# Patient Record
Sex: FEMALE | Race: OTHER | HISPANIC OR LATINO
[De-identification: names, ages, dates, MRNs, and addresses within clinical notes are randomized per-mention and may not be internally consistent; named-entity substitution may affect disease eponyms.]

---

## 2017-04-24 ENCOUNTER — APPOINTMENT (OUTPATIENT)
Dept: SURGERY | Facility: CLINIC | Age: 52
End: 2017-04-24

## 2017-06-22 ENCOUNTER — APPOINTMENT (OUTPATIENT)
Dept: SURGERY | Facility: CLINIC | Age: 52
End: 2017-06-22

## 2017-06-22 VITALS
WEIGHT: 132 LBS | HEART RATE: 74 BPM | TEMPERATURE: 98.2 F | BODY MASS INDEX: 29.69 KG/M2 | SYSTOLIC BLOOD PRESSURE: 117 MMHG | DIASTOLIC BLOOD PRESSURE: 80 MMHG | HEIGHT: 56 IN

## 2017-09-26 PROBLEM — Z87.19 HISTORY OF ESOPHAGITIS: Status: RESOLVED | Noted: 2017-09-26 | Resolved: 2017-09-26

## 2017-09-26 PROBLEM — Z87.19 HISTORY OF GASTRITIS: Status: RESOLVED | Noted: 2017-09-26 | Resolved: 2017-09-26

## 2017-09-26 PROBLEM — Z87.19 HISTORY OF CHOLECYSTITIS: Status: RESOLVED | Noted: 2017-09-26 | Resolved: 2017-09-26

## 2017-09-26 PROBLEM — K43.9 VENTRAL HERNIA WITHOUT OBSTRUCTION OR GANGRENE: Status: RESOLVED | Noted: 2017-09-26 | Resolved: 2017-09-26

## 2017-09-28 ENCOUNTER — APPOINTMENT (OUTPATIENT)
Dept: SURGERY | Facility: CLINIC | Age: 52
End: 2017-09-28
Payer: MEDICAID

## 2017-09-28 VITALS
HEIGHT: 56 IN | BODY MASS INDEX: 29.47 KG/M2 | WEIGHT: 131 LBS | SYSTOLIC BLOOD PRESSURE: 97 MMHG | DIASTOLIC BLOOD PRESSURE: 64 MMHG | OXYGEN SATURATION: 100 % | TEMPERATURE: 98.2 F | HEART RATE: 72 BPM

## 2017-09-28 DIAGNOSIS — K43.9 VENTRAL HERNIA W/OUT OBSTRUCTION OR GANGRENE: ICD-10-CM

## 2017-09-28 DIAGNOSIS — Z87.19 PERSONAL HISTORY OF OTHER DISEASES OF THE DIGESTIVE SYSTEM: ICD-10-CM

## 2017-09-28 PROCEDURE — XXXXX: CPT

## 2017-10-09 ENCOUNTER — APPOINTMENT (OUTPATIENT)
Dept: SURGERY | Facility: CLINIC | Age: 52
End: 2017-10-09

## 2017-10-09 VITALS
HEIGHT: 56 IN | TEMPERATURE: 98.2 F | BODY MASS INDEX: 29.69 KG/M2 | DIASTOLIC BLOOD PRESSURE: 73 MMHG | WEIGHT: 132 LBS | SYSTOLIC BLOOD PRESSURE: 117 MMHG | OXYGEN SATURATION: 100 % | HEART RATE: 89 BPM

## 2018-09-24 ENCOUNTER — APPOINTMENT (OUTPATIENT)
Dept: SURGERY | Facility: CLINIC | Age: 53
End: 2018-09-24
Payer: MEDICAID

## 2018-09-24 VITALS
HEIGHT: 56 IN | TEMPERATURE: 97.8 F | OXYGEN SATURATION: 100 % | WEIGHT: 139 LBS | BODY MASS INDEX: 31.27 KG/M2 | DIASTOLIC BLOOD PRESSURE: 62 MMHG | HEART RATE: 92 BPM | SYSTOLIC BLOOD PRESSURE: 93 MMHG

## 2018-09-24 PROCEDURE — 99213 OFFICE O/P EST LOW 20 MIN: CPT

## 2018-10-29 ENCOUNTER — APPOINTMENT (OUTPATIENT)
Dept: SURGERY | Facility: CLINIC | Age: 53
End: 2018-10-29
Payer: COMMERCIAL

## 2018-10-29 VITALS
BODY MASS INDEX: 29.25 KG/M2 | HEART RATE: 79 BPM | TEMPERATURE: 98 F | OXYGEN SATURATION: 98 % | DIASTOLIC BLOOD PRESSURE: 77 MMHG | WEIGHT: 130 LBS | SYSTOLIC BLOOD PRESSURE: 116 MMHG | HEIGHT: 56 IN

## 2018-10-29 DIAGNOSIS — N64.4 MASTODYNIA: ICD-10-CM

## 2018-10-29 PROCEDURE — 99213 OFFICE O/P EST LOW 20 MIN: CPT

## 2019-10-28 ENCOUNTER — APPOINTMENT (OUTPATIENT)
Dept: SURGERY | Facility: CLINIC | Age: 54
End: 2019-10-28
Payer: COMMERCIAL

## 2019-10-28 VITALS
SYSTOLIC BLOOD PRESSURE: 121 MMHG | TEMPERATURE: 98.2 F | WEIGHT: 135 LBS | BODY MASS INDEX: 30.37 KG/M2 | HEIGHT: 56 IN | OXYGEN SATURATION: 100 % | HEART RATE: 97 BPM | DIASTOLIC BLOOD PRESSURE: 76 MMHG

## 2019-10-28 DIAGNOSIS — Z86.39 PERSONAL HISTORY OF OTHER ENDOCRINE, NUTRITIONAL AND METABOLIC DISEASE: ICD-10-CM

## 2019-10-28 PROCEDURE — 99213 OFFICE O/P EST LOW 20 MIN: CPT

## 2019-10-28 RX ORDER — OMEPRAZOLE 40 MG/1
40 CAPSULE, DELAYED RELEASE ORAL
Refills: 0 | Status: ACTIVE | COMMUNITY

## 2019-10-28 RX ORDER — SIMVASTATIN 80 MG/1
TABLET, FILM COATED ORAL
Refills: 0 | Status: ACTIVE | COMMUNITY

## 2019-10-28 NOTE — PLAN
[FreeTextEntry1] : Patient is presenting for a follow up visit.  Patient is doing well. Results of her breast imaging and physical examination findings were discussed in details.   Patient was advised to have BL            breast US and Mammogram in  10/2020 and return after the tests. Importance of monthly self-breast examination was reinforced.  Patient's questions and concerns addressed to patient's satisfaction.\par \par

## 2019-10-28 NOTE — DATA REVIEWED
[FreeTextEntry1] : RADIOLOGY REPORT   FINDINGS   FINDING Indication: 54 years old for screening. Reported history of benign right breast biopsy 2 years ago. There is no family history of breast cancer.  Date of last clinical exam:"Not available.".  Comparison is made with prior mammogram and bilateral breast ultrasound dated 10/12/2018.  CC and MLO views of both breasts were obtained. This study was interpreted in conjunction with the [employer] computer aided detection system.  There are scattered fibroglandular densities. No dominant masses, significant calcifications or areas of architectural distortion are identified. Nonenlarged bilateral axillary lymph nodes are present.  Bilateral breast ultrasound was performed around the clock including the retroareolar regions and axillae using a high resolution linear array transducer.   Right breast:  10:00, 3 cm from the nipple, stable 9 mm cluster of cysts.  10:00, 6 cm from the nipple, newly visualized 5 mm cluster of cysts.  Left breast:  3:00, 8 cm from the nipple, stable 8 mm cluster of cysts.   There are no enlarged axillary lymph nodes on either side.  IMPRESSION:   No mammographic or sonographic evidence of malignancy.  BI-RADS CATEGORY: 2 - Benign   Recommendation: Routine annual screening mammogram.

## 2019-10-28 NOTE — HISTORY OF PRESENT ILLNESS
[de-identified] : This is  a 54 years old patient presenting today for a breast exam and to discuss the results of her recent  breast imaging. Patient had a BL breast US and   BL breast Mammogram on 10/16/2019. Deemed BIRADS category 2. Patient denies any trauma and she has no nipple discharge. Patient denies any fever, night sweats or loss of appetite.    There is no family history of breast carcinoma.  Her last menstrual period was  last year.  Patient is on no hormonal replacement therapy.  [de-identified] : Patient reports no interval changes to her overall health status or medical history

## 2019-10-28 NOTE — PHYSICAL EXAM
[Alert] : alert [Oriented to Person] : oriented to person [Oriented to Place] : oriented to place [Oriented to Time] : oriented to time [Calm] : calm [de-identified] : The patient is alert, well-groomed, and cheerful. [de-identified] : Thorax symmetric with good excursion. Lungs resonant. Breath sounds vesicular with no added sounds. [de-identified] : symmetric breasts with no nipple or skin retraction.  On palpation of her breasts, there is no palpable masses.  \par    No palpable lymph nodes.  No palpable supraclavicular masses. Axillas are benign.

## 2021-06-28 ENCOUNTER — APPOINTMENT (OUTPATIENT)
Dept: SURGERY | Facility: CLINIC | Age: 56
End: 2021-06-28
Payer: COMMERCIAL

## 2021-06-28 VITALS — TEMPERATURE: 97.7 F

## 2021-06-28 VITALS
HEIGHT: 56 IN | HEART RATE: 96 BPM | WEIGHT: 135 LBS | OXYGEN SATURATION: 99 % | BODY MASS INDEX: 30.37 KG/M2 | SYSTOLIC BLOOD PRESSURE: 125 MMHG | DIASTOLIC BLOOD PRESSURE: 77 MMHG

## 2021-06-28 DIAGNOSIS — M54.9 DORSALGIA, UNSPECIFIED: ICD-10-CM

## 2021-06-28 PROCEDURE — 99213 OFFICE O/P EST LOW 20 MIN: CPT

## 2021-06-28 NOTE — VITALS
[de-identified] : 7/10 [FreeTextEntry3] : back  [FreeTextEntry1] : rest  [FreeTextEntry2] : walking

## 2021-06-28 NOTE — DATA REVIEWED
[FreeTextEntry1] : Patient Name\par ROBBIE NAJERA\par Date of Birth\par 1965\par Procedure\par MA 3D DIGITAL SCREENING MAMMOGRAPHY\par Study Date & Time\par 2020-10-26 10:36 AM\par Patient ID\par [id number]\par Accession Number\par [id number]\par Referring Physician\par YOEL SAINI\par Institution Name\par [facility]\par Report\par RADIOLOGY REPORT\par FINDINGS\par FINDING\par Indication: 55 years old for screening. There is no family history of breast cancer.\par Date of last clinical exam:"Not available".\par Comparison is made with multiple prior mammograms dating back to 8/24/2017.\par 3D digital CC and MLO views of both breasts were obtained. Reconstructed CC and MLO views were\par obtained. This study was interpreted in conjunction with a computer aided detection system.\par The breasts are composed of scattered fibroglandular elements. Bilateral scattered coarse and\par punctate benign-appearing calcifications are unchanged. No dominant masses, significant\par calcifications or areas of architectural distortion are identified. Nonenlarged bilateral axillary\par lymph nodes are present.\par IMPRESSION:\par No mammographic evidence of malignancy.\par BI-RADS CATEGORY: 2 - Benign\par Recommendation: Routine annual screening mammogram.\par A letter will be sent to the patient with the above recommendations.\par Approximately 10% of breast carcinomas are not radiographically detectable. A negative report should\par not delay biopsy if a clinically suspicious mass is present. Dense breasts may obscure an underlying\par neoplasm.\par Patient has been added into a reminder system with a target due date for their next mammogram.\par BIRADS 1: Negative\par BIRADS 2: Benign\par BIRADS 3: Probably Benign\par BIRADS 4: Suspicious Abnormality - Biopsy should be considered.\par BIRADS 4a: Low Suspicion of Malignancy\par BIRADS 4b: Intermediate Suspicion of Malignancy\par 6/23/2021 [employer]\par [email or web address] 2/2\par BIRADS 4c: Moderately Suspicious of Malignancy\par BIRADS 5: Highly Suspicious of Malignancy\par BIRADS 6: Known Malignancy\par BIRADS 0: Incomplete - Need Additional Imaging Evaluation and/or Prior Mammograms for Comparison\par The [place] Department of Health requires us to indicate the date of the patient's last\par clinical breast examination.\par Electronically signed by: Jeane Vaca MD 10/27/2020 10:29 AM\par Workstation: [id number]\par Electronically Signed By: Jeane Vaca MD\par Sign Date: 27-OCT-20[facility]

## 2021-06-28 NOTE — PLAN
[FreeTextEntry1] : Ms. NAJERA  is presenting  today for an evaluation .   she has back pain due to the breast size. she is doing well and offers no other  complaints.  Results of  her recent  imaging and physical examination findings were discussed in details.   She  was advised to have BL       breast US and Mammogram in  October and return after the tests. she was advised to see plastic surgeon for possible mammoplasty.  Importance of monthly self-breast examination was reinforced.  Patient's questions and concerns addressed to patient's satisfaction.\par \par Vitamin E daily for breast pain \par Avoid caffein and Chocolates to prevent breast pain

## 2021-06-28 NOTE — HISTORY OF PRESENT ILLNESS
[de-identified] :  This is  a 56 year   old patient presenting today for a breast exam and  with complaints of severe back pain due to the breast size. Patient had a BL breast  Mammogram on 10/26/2020Deemed BIRADS category 2.   Her breast size is 38 DD.  she takes Tylenol and Ibuprofen for pain. Ms. NAJERA denies any trauma and she has no nipple discharge. Patient denies any fever, night sweats or loss of appetite.    There is no family history of breast carcinoma.  Patient is on no hormonal replacement therapy.

## 2021-06-28 NOTE — PHYSICAL EXAM
[Alert] : alert [Oriented to Person] : oriented to person [Oriented to Place] : oriented to place [Oriented to Time] : oriented to time [Calm] : calm [de-identified] : She  is alert, well-groomed, and in NAD\par   [de-identified] : anicteric.  Nasal mucosa pink, septum midline. Oral mucosa pink.  Tongue midline, Pharynx without exudates.\par   [de-identified] : Neck supple. Trachea midline. Thyroid isthmus barely palpable, lobes not felt.\par   [de-identified] : No chest deformity. Breast are symmetric, Normal contours. No nodules, masses, tenderness, or axillary or supraclavicular  adenopathy. No nipple discharge. no skin retraction \par

## 2021-11-02 PROBLEM — Z12.39 SCREENING BREAST EXAMINATION: Status: ACTIVE | Noted: 2019-10-28

## 2021-11-04 ENCOUNTER — APPOINTMENT (OUTPATIENT)
Dept: SURGERY | Facility: CLINIC | Age: 56
End: 2021-11-04
Payer: MEDICAID

## 2021-11-04 VITALS
DIASTOLIC BLOOD PRESSURE: 72 MMHG | WEIGHT: 138 LBS | SYSTOLIC BLOOD PRESSURE: 115 MMHG | BODY MASS INDEX: 31.05 KG/M2 | HEART RATE: 82 BPM | HEIGHT: 56 IN

## 2021-11-04 VITALS — TEMPERATURE: 97 F

## 2021-11-04 DIAGNOSIS — Z12.39 ENCOUNTER FOR OTHER SCREENING FOR MALIGNANT NEOPLASM OF BREAST: ICD-10-CM

## 2021-11-04 PROCEDURE — 99213 OFFICE O/P EST LOW 20 MIN: CPT

## 2021-11-04 NOTE — HISTORY OF PRESENT ILLNESS
[de-identified] : This is  a 56 year   old patient presenting today for a breast exam and to discuss the results of her recent  breast imaging.     she was seen in June  with complaints of severe back pain due to the breast size.  Her breast size is 38 DD.   Patient had a BL breast US and   BL breast Mammogram on 10/28/2021. Deemed BIRADS category 2.   Ms. NAJERA denies any trauma and she has no nipple discharge. Patient denies any fever, night sweats or loss of appetite.  she is seeing a plastic surgeon for mammoplasty. \par \par \par  [de-identified] : Patient reports no interval changes to her overall health status or medical history

## 2021-11-04 NOTE — PHYSICAL EXAM
[Alert] : alert [Oriented to Person] : oriented to person [Oriented to Place] : oriented to place [Oriented to Time] : oriented to time [Calm] : calm [de-identified] : She  is alert, well-groomed, and in NAD\par   [de-identified] : anicteric.  Nasal mucosa pink, septum midline. Oral mucosa pink.  Tongue midline, Pharynx without exudates.\par   [de-identified] : Neck supple. Trachea midline. Thyroid isthmus barely palpable, lobes not felt.\par   [de-identified] : No chest deformity. Breast are symmetric, Normal contours. No nodules, masses, tenderness, or axillary or supraclavicular  adenopathy. No nipple discharge. no skin retraction \par

## 2021-11-04 NOTE — DATA REVIEWED
[FreeTextEntry1] : Patient Name\par ROBBIE NAJERA\par Date of Birth\par 1965\par Procedure\par US SCREENING BREAST BILATERAL\par Study Date & Time\par 2021-10-28 10:02 AM\par Patient ID\par [id number]\par Accession Number\par [id number]\par Referring Physician\par SHELLI CARRANZA\par Institution Name\par [facility]\par Report\par RADIOLOGY REPORT\par FINDINGS\par FINDING\par Indication: 56 years old for screening. Reported history of benign right breast biopsy 2 years ago.\par There is no family history of breast cancer.\par Date of last clinical exam:"6/28/2021".\par Comparison is made with multiple prior mammograms dating back to 10/12/2018 and prior breast\par ultrasounds dated 10/16/2019 and 10/12/2018.\par 3D digital CC and MLO views of both breasts were obtained. Reconstructed CC and MLO views were\par obtained. This study was interpreted in conjunction with a computer aided detection system.\par Findings:\par The breast tissue is heterogeneously dense, which may obscure small masses. Bilateral scattered\par coarse benign-appearing calcifications are unchanged. No dominant masses, significant calcifications\par or areas of architectural distortion are identified. Nonenlarged bilateral axillary lymph nodes are\par present.\par Bilateral breast ultrasound was performed around the clock including the retroareolar regions and\par axillae using a high resolution linear array transducer.\par Right breast:\par 10:00, 6 cm from the nipple, 0.7 cm cluster of cysts, unchanged dating back to 10/12/2018.\par 10:00, 3 cm from the nipple, 0.7 cm cluster of cysts, unchanged dating back to 10/12/2018.\par Left breast:\par 3:00, 8 cm from the nipple, 0.9 cm cluster of cysts is slightly larger but has a benign appearance.\par 3:00, 10 cm from the nipple, newly visualized 0.5 cm intramammary lymph node.\par There are no enlarged axillary lymph nodes on either side.\par Impression:\par 11/2/2021 [employer]\par [email or web address] 2/2\par No mammographic or sonographic evidence of malignancy.\par BI-RADS CATEGORY: 2 - Benign\par Recommendation: Routine annual screening mammogram.\par A letter will be sent to the patient with the above recommendations.\par Approximately 10% of breast carcinomas are not radiographically detectable. A negative report should\par not delay biopsy if a clinically suspicious mass is present. Dense breasts may obscure an underlying\par neoplasm.\par Patient has been added into a reminder system with a target due date for their next mammogram.\par BIRADS 1: Negative\par BIRADS 2: Benign\par BIRADS 3: Probably Benign\par BIRADS 4: Suspicious Abnormality - Biopsy should be considered.\par BIRADS 4a: Low Suspicion of Malignancy\par BIRADS 4b: Intermediate Suspicion of Malignancy\par BIRADS 4c: Moderately Suspicious of Malignancy\par BIRADS 5: Highly Suspicious of Malignancy\par BIRADS 6: Known Malignancy\par BIRADS 0: Incomplete - Need Additional Imaging Evaluation and/or Prior Mammograms for Comparison\par The [place] Department of Health requires us to indicate the date of the patient's last\par clinical breast examination.\par Electronically signed by: Jeane Vaca MD 10/28/2021 11:13 AM\par Workstation: [id number]\par Electronically Signed By: Jeane Vaca MD\par Sign Date: 28-OCT-21[facility]

## 2021-11-04 NOTE — PLAN
[FreeTextEntry1] : Ms. NAJERA  is presenting  today for an evaluation .  she is doing well and offers no complaints.  Results of  her recent  imaging and physical examination findings were discussed in details.   She  was advised to have BL    breast US and Mammogram in  October 2022 and return after the tests. Importance of monthly self-breast examination was reinforced.  Patient's questions and concerns addressed to patient's satisfaction.\par \par

## 2023-02-28 NOTE — VITALS
[de-identified] : no pain  Arava Counseling:  Patient counseled regarding adverse effects of Arava including but not limited to nausea, vomiting, abnormalities in liver function tests. Patients may develop mouth sores, rash, diarrhea, and abnormalities in blood counts. The patient understands that monitoring is required including LFTs and blood counts.  There is a rare possibility of scarring of the liver and lung problems that can occur when taking methotrexate. Persistent nausea, loss of appetite, pale stools, dark urine, cough, and shortness of breath should be reported immediately. Patient advised to discontinue Arava treatment and consult with a physician prior to attempting conception. The patient will have to undergo a treatment to eliminate Arava from the body prior to conception.